# Patient Record
Sex: FEMALE | Race: ASIAN | Employment: UNEMPLOYED | URBAN - METROPOLITAN AREA
[De-identification: names, ages, dates, MRNs, and addresses within clinical notes are randomized per-mention and may not be internally consistent; named-entity substitution may affect disease eponyms.]

---

## 2019-02-21 ENCOUNTER — OFFICE VISIT (OUTPATIENT)
Dept: FAMILY MEDICINE | Facility: CLINIC | Age: 34
End: 2019-02-21
Payer: COMMERCIAL

## 2019-02-21 VITALS
OXYGEN SATURATION: 99 % | SYSTOLIC BLOOD PRESSURE: 108 MMHG | HEIGHT: 65 IN | TEMPERATURE: 97.7 F | BODY MASS INDEX: 18.83 KG/M2 | HEART RATE: 86 BPM | WEIGHT: 113 LBS | DIASTOLIC BLOOD PRESSURE: 80 MMHG

## 2019-02-21 DIAGNOSIS — N94.10 DYSPAREUNIA IN FEMALE: ICD-10-CM

## 2019-02-21 DIAGNOSIS — R10.30 LOWER ABDOMINAL PAIN: Primary | ICD-10-CM

## 2019-02-21 DIAGNOSIS — R42 DIZZINESS: ICD-10-CM

## 2019-02-21 DIAGNOSIS — Z12.4 SCREENING FOR CERVICAL CANCER: ICD-10-CM

## 2019-02-21 DIAGNOSIS — Z76.89 ENCOUNTER TO ESTABLISH CARE: ICD-10-CM

## 2019-02-21 DIAGNOSIS — Z11.3 ROUTINE SCREENING FOR STI (SEXUALLY TRANSMITTED INFECTION): ICD-10-CM

## 2019-02-21 LAB
ALBUMIN UR-MCNC: NEGATIVE MG/DL
APPEARANCE UR: CLEAR
BACTERIA #/AREA URNS HPF: ABNORMAL /HPF
BILIRUB UR QL STRIP: NEGATIVE
COLOR UR AUTO: YELLOW
ERYTHROCYTE [DISTWIDTH] IN BLOOD BY AUTOMATED COUNT: 12.1 % (ref 10–15)
GLUCOSE UR STRIP-MCNC: NEGATIVE MG/DL
HCG UR QL: NEGATIVE
HCT VFR BLD AUTO: 43.9 % (ref 35–47)
HGB BLD-MCNC: 14.1 G/DL (ref 11.7–15.7)
HGB UR QL STRIP: ABNORMAL
KETONES UR STRIP-MCNC: NEGATIVE MG/DL
LEUKOCYTE ESTERASE UR QL STRIP: ABNORMAL
MCH RBC QN AUTO: 29.5 PG (ref 26.5–33)
MCHC RBC AUTO-ENTMCNC: 32.1 G/DL (ref 31.5–36.5)
MCV RBC AUTO: 92 FL (ref 78–100)
NITRATE UR QL: NEGATIVE
NON-SQ EPI CELLS #/AREA URNS LPF: ABNORMAL /LPF
PH UR STRIP: 7 PH (ref 5–7)
PLATELET # BLD AUTO: 228 10E9/L (ref 150–450)
RBC # BLD AUTO: 4.78 10E12/L (ref 3.8–5.2)
RBC #/AREA URNS AUTO: ABNORMAL /HPF
SOURCE: ABNORMAL
SP GR UR STRIP: 1.01 (ref 1–1.03)
SPECIMEN SOURCE: ABNORMAL
UROBILINOGEN UR STRIP-ACNC: 0.2 EU/DL (ref 0.2–1)
WBC # BLD AUTO: 6.2 10E9/L (ref 4–11)
WBC #/AREA URNS AUTO: ABNORMAL /HPF
WET PREP SPEC: ABNORMAL

## 2019-02-21 PROCEDURE — 36415 COLL VENOUS BLD VENIPUNCTURE: CPT | Performed by: NURSE PRACTITIONER

## 2019-02-21 PROCEDURE — 81001 URINALYSIS AUTO W/SCOPE: CPT | Performed by: NURSE PRACTITIONER

## 2019-02-21 PROCEDURE — 85027 COMPLETE CBC AUTOMATED: CPT | Performed by: NURSE PRACTITIONER

## 2019-02-21 PROCEDURE — 87624 HPV HI-RISK TYP POOLED RSLT: CPT | Performed by: NURSE PRACTITIONER

## 2019-02-21 PROCEDURE — 87491 CHLMYD TRACH DNA AMP PROBE: CPT | Performed by: NURSE PRACTITIONER

## 2019-02-21 PROCEDURE — 81025 URINE PREGNANCY TEST: CPT | Performed by: NURSE PRACTITIONER

## 2019-02-21 PROCEDURE — 87591 N.GONORRHOEAE DNA AMP PROB: CPT | Performed by: NURSE PRACTITIONER

## 2019-02-21 PROCEDURE — 80053 COMPREHEN METABOLIC PANEL: CPT | Performed by: NURSE PRACTITIONER

## 2019-02-21 PROCEDURE — 87340 HEPATITIS B SURFACE AG IA: CPT | Performed by: NURSE PRACTITIONER

## 2019-02-21 PROCEDURE — 84443 ASSAY THYROID STIM HORMONE: CPT | Performed by: NURSE PRACTITIONER

## 2019-02-21 PROCEDURE — 0064U ANTB TP TOTAL&RPR IA QUAL: CPT | Performed by: NURSE PRACTITIONER

## 2019-02-21 PROCEDURE — 99204 OFFICE O/P NEW MOD 45 MIN: CPT | Performed by: NURSE PRACTITIONER

## 2019-02-21 PROCEDURE — T1013 SIGN LANG/ORAL INTERPRETER: HCPCS | Mod: U3 | Performed by: NURSE PRACTITIONER

## 2019-02-21 PROCEDURE — 86706 HEP B SURFACE ANTIBODY: CPT | Performed by: NURSE PRACTITIONER

## 2019-02-21 PROCEDURE — 87210 SMEAR WET MOUNT SALINE/INK: CPT | Performed by: NURSE PRACTITIONER

## 2019-02-21 PROCEDURE — G0145 SCR C/V CYTO,THINLAYER,RESCR: HCPCS | Performed by: NURSE PRACTITIONER

## 2019-02-21 PROCEDURE — 87389 HIV-1 AG W/HIV-1&-2 AB AG IA: CPT | Performed by: NURSE PRACTITIONER

## 2019-02-21 RX ORDER — PNV NO.95/FERROUS FUM/FOLIC AC 28MG-0.8MG
TABLET ORAL
COMMUNITY

## 2019-02-21 SDOH — HEALTH STABILITY: MENTAL HEALTH: HOW OFTEN DO YOU HAVE A DRINK CONTAINING ALCOHOL?: NEVER

## 2019-02-21 ASSESSMENT — MIFFLIN-ST. JEOR: SCORE: 1223.44

## 2019-02-21 NOTE — PROGRESS NOTES
SUBJECTIVE:   Merrill Jose is a 33 year old female who presents to clinic today for the following health issues:      ABDOMINAL/ Pelvic - 8 months ago had a physical in [place]- [place] coming to [place] - went to get a physical and all is well.  4 months since her arrival from [facility]    PAIN - At night it seems like when she lays down she is hearing the wheezing, and feels like its difficult of breathing- like congested not SOB- she states has only occurred 3 or 4 times.  Experiences dizziness sometimes when she wakes up- this has happened for a while even before moving to [place]- but its more frequently here.     Onset: 2 and a half weeks    Description:   Character: Cramping/ burning sensation  Location: pelvic region  Radiation: Back - constant for the last month states she gets back pain a week before menstrual period     Intensity: moderate    Progression of Symptoms:  same    Accompanying Signs & Symptoms:  Fever/Chills?: no   Gas/Bloating: YES- at night she can hear rumbling sound at night- sometimes during the day- after eating she can hear them- only has experienced it here in the [place].   Nausea: no   Vomitting: no   Diarrhea?: YES-  When she eats certain foods.  Constipation:YES- feels like there's difficulty in BM's she has to strain as well for her BM's   Dysuria or Hematuria: no- but has Frequency in urine   History:   Trauma: no   Previous similar pain: no    Previous tests done: had a full physical before leaving [facility] but not for this    Precipitating factors:   Does the pain change with:     Food: YES after eating has diarrhea    BM: YES    Urination: no     Alleviating factors:  She sates a long time ago left a UA and got a medication to help with the urination     Therapies Tried and outcome:  Advil - 2 days ago    LMP:  2/7/19     +Epigastric.    Has been taking Cimetidine for upper stomach pain during the menses.      Now for the past 2 years, has had lower back pain associated with menses.    1  "week prior to menstrual cycle, has lower back pain and stiffness with standing.  Back pain starts before and continues throughout menses, less intense during period.    +Cramping during menses.    No change in menstrual flow.    +Dyspareunia.      No chronic health conditions.     - Is desiring pregnancy.         [place]   has lived here for 12 years.      Problem list and histories reviewed & adjusted, as indicated.  Additional history: as documented    There is no problem list on file for this patient.    Past Surgical History:   Procedure Laterality Date     AS THORACOTOMY,CYST REMOVAL      bilateral breast        Social History     Tobacco Use     Smoking status: Never Smoker     Smokeless tobacco: Never Used   Substance Use Topics     Alcohol use: No     Frequency: Never     No family history on file.      Current Outpatient Medications   Medication Sig Dispense Refill     Prenatal Vit-Fe Fumarate-FA (PRENATAL VITAMIN) 27-0.8 MG TABS        No Known Allergies    Reviewed and updated as needed this visit by clinical staff       Reviewed and updated as needed this visit by Provider         ROS:  Constitutional, HEENT, cardiovascular, pulmonary, GI, , musculoskeletal, neuro, skin, endocrine and psych systems are negative, except as otherwise noted.    OBJECTIVE:     /80 (BP Location: Right arm, Patient Position: Sitting, Cuff Size: Adult Regular)   Pulse 86   Temp 97.7  F (36.5  C) (Oral)   Ht 1.651 m (5' 5\")   Wt 51.3 kg (113 lb)   SpO2 99%   BMI 18.80 kg/m    Body mass index is 18.8 kg/m .    GENERAL: healthy, alert and no distress  EYES: Eyes grossly normal to inspection, PERRL and conjunctivae and sclerae normal  HENT: ear canals and TM's normal, nose and mouth without ulcers or lesions  NECK: no adenopathy, no asymmetry, masses, or scars and thyroid normal to palpation  RESP: lungs clear to auscultation - no rales, rhonchi or wheezes  CV: regular rate and " rhythm, normal S1 S2  ABDOMEN: soft, +epigastric, right and left lower quadrant tenderness with palpation, no hepatosplenomegaly, no masses and bowel sounds normal  : normal external genitalia, cervix and uterus normal, Bimanual exam with diffuse uterine/adnexal tenderness to palpation, pap smear, wet prep and GC/CC obtained  MS: no gross musculoskeletal defects noted, no edema  PSYCH: mentation appears normal, affect normal/bright    Diagnostic Test Results:  Pending    ASSESSMENT/PLAN:       Merrill was seen today for abdominal pain.    Diagnoses and all orders for this visit:    Encounter to establish care    Lower abdominal pain  Dyspareunia in female  -     UA reflex to Microscopic and Culture  -     NEISSERIA GONORRHOEA PCR  -     CHLAMYDIA TRACHOMATIS PCR  -     Wet prep  -     US Pelvic Complete w Transvaginal; Future - scheduled on 2/28/19  -     Beta HCG Qual, Urine - [facility] and [place] ([id number])    Dizziness  -     TSH with free T4 reflex  -     CBC with platelets  -     Comprehensive metabolic panel (BMP + Alb, Alk Phos, ALT, AST, Total. Bili, TP)    Screening for cervical cancer  -     Pap imaged thin layer screen with HPV - recommended age 30 - 65 years (select HPV order below)  -     HPV High Risk Types DNA Cervical    Routine screening for STI (sexually transmitted infection)  -     HIV Antigen Antibody Combo  -     Hepatitis B Surface Antibody  -     Hepatitis B surface antigen  -     Treponema Abs w Reflex to RPR and Titer        Follow-up in 1-2 weeks, sooner as needed.        RENATA Dukes [facility]

## 2019-02-21 NOTE — LETTER
March 1, 2019    Merrill Jose  [address]  [place] [address]    Dear ,  This letter is regarding your recent Pap smear (cervical cancer screening) and Human Papillomavirus (HPV) test.  We are happy to inform you that your Pap smear result is normal. Cervical cancer is closely linked with certain types of HPV. Your results showed no evidence of high-risk HPV.  Therefore we recommend you return in 5 years for your next pap smear and HPV test.  You will still need to return to the clinic every year for an annual exam and other preventive tests.  If you have additional questions regarding this result, please call our registered nurse, Bhavna at [phone].  Sincerely,    Ai Mayorga, APRN CNP/es

## 2019-02-22 LAB
ALBUMIN SERPL-MCNC: 4.6 G/DL (ref 3.4–5)
ALP SERPL-CCNC: 21 U/L (ref 40–150)
ALT SERPL W P-5'-P-CCNC: 18 U/L (ref 0–50)
ANION GAP SERPL CALCULATED.3IONS-SCNC: 5 MMOL/L (ref 3–14)
AST SERPL W P-5'-P-CCNC: 20 U/L (ref 0–45)
BILIRUB SERPL-MCNC: 0.8 MG/DL (ref 0.2–1.3)
BUN SERPL-MCNC: 10 MG/DL (ref 7–30)
C TRACH DNA SPEC QL NAA+PROBE: NEGATIVE
CALCIUM SERPL-MCNC: 9.2 MG/DL (ref 8.5–10.1)
CHLORIDE SERPL-SCNC: 103 MMOL/L (ref 94–109)
CO2 SERPL-SCNC: 28 MMOL/L (ref 20–32)
CREAT SERPL-MCNC: 0.58 MG/DL (ref 0.52–1.04)
GFR SERPL CREATININE-BSD FRML MDRD: >90 ML/MIN/{1.73_M2}
GLUCOSE SERPL-MCNC: 81 MG/DL (ref 70–99)
N GONORRHOEA DNA SPEC QL NAA+PROBE: NEGATIVE
POTASSIUM SERPL-SCNC: 3.9 MMOL/L (ref 3.4–5.3)
PROT SERPL-MCNC: 8.1 G/DL (ref 6.8–8.8)
SODIUM SERPL-SCNC: 136 MMOL/L (ref 133–144)
SPECIMEN SOURCE: NORMAL
SPECIMEN SOURCE: NORMAL
T PALLIDUM AB SER QL: NONREACTIVE
TSH SERPL DL<=0.005 MIU/L-ACNC: 3.28 MU/L (ref 0.4–4)

## 2019-02-25 LAB
COPATH REPORT: NORMAL
PAP: NORMAL

## 2019-02-26 LAB
FINAL DIAGNOSIS: NORMAL
HBV SURFACE AB SERPL IA-ACNC: 228.7 M[IU]/ML
HBV SURFACE AG SERPL QL IA: NONREACTIVE
HIV 1+2 AB+HIV1 P24 AG SERPL QL IA: NONREACTIVE
HPV HR 12 DNA CVX QL NAA+PROBE: NEGATIVE
HPV16 DNA SPEC QL NAA+PROBE: NEGATIVE
HPV18 DNA SPEC QL NAA+PROBE: NEGATIVE
SPECIMEN DESCRIPTION: NORMAL
SPECIMEN SOURCE CVX/VAG CYTO: NORMAL

## 2019-02-28 ENCOUNTER — HOSPITAL ENCOUNTER (OUTPATIENT)
Dept: ULTRASOUND IMAGING | Facility: CLINIC | Age: 34
Discharge: HOME | End: 2019-02-28
Attending: NURSE PRACTITIONER | Admitting: NURSE PRACTITIONER
Payer: COMMERCIAL

## 2019-02-28 DIAGNOSIS — R10.30 LOWER ABDOMINAL PAIN: ICD-10-CM

## 2019-02-28 PROCEDURE — 76830 TRANSVAGINAL US NON-OB: CPT

## 2019-03-04 ENCOUNTER — OFFICE VISIT (OUTPATIENT)
Dept: FAMILY MEDICINE | Facility: CLINIC | Age: 34
End: 2019-03-04
Payer: COMMERCIAL

## 2019-03-04 VITALS
SYSTOLIC BLOOD PRESSURE: 102 MMHG | HEIGHT: 65 IN | HEART RATE: 84 BPM | TEMPERATURE: 98.3 F | DIASTOLIC BLOOD PRESSURE: 62 MMHG | WEIGHT: 112.56 LBS | OXYGEN SATURATION: 100 % | BODY MASS INDEX: 18.75 KG/M2

## 2019-03-04 DIAGNOSIS — Z71.2 ENCOUNTER TO DISCUSS TEST RESULTS: Primary | ICD-10-CM

## 2019-03-04 DIAGNOSIS — N83.201 CYST OF RIGHT OVARY: ICD-10-CM

## 2019-03-04 DIAGNOSIS — N76.0 BACTERIAL VAGINOSIS: ICD-10-CM

## 2019-03-04 DIAGNOSIS — B96.89 BACTERIAL VAGINOSIS: ICD-10-CM

## 2019-03-04 DIAGNOSIS — R10.2 PELVIC PAIN IN FEMALE: ICD-10-CM

## 2019-03-04 PROCEDURE — 99214 OFFICE O/P EST MOD 30 MIN: CPT | Performed by: NURSE PRACTITIONER

## 2019-03-04 RX ORDER — METRONIDAZOLE 7.5 MG/G
1 GEL VAGINAL DAILY
Qty: 70 G | Refills: 0 | Status: SHIPPED | OUTPATIENT
Start: 2019-03-04 | End: 2019-03-21

## 2019-03-04 ASSESSMENT — MIFFLIN-ST. JEOR: SCORE: 1221.46

## 2019-03-04 NOTE — PROGRESS NOTES
"  SUBJECTIVE:   Merrill Jose is a 33 year old female who presents to clinic today for the following health issues:      Ultrasound and lab result follow-up.      Patient reports continued right lower quadrant pain.  Tender to palpation.    +Dyspareunia and pelvic pain.  Is desiring pregnancy and is concerned about her fertility.    Has been trying to conceive for the past 4 months, since moving from [facility] to be with her .      Received Hepatitis B immunization in [facility].  Is wondering about her immunity.      Had a pelvic ultrasound in [facility] approximately 8 months ago and was told that she didn't have a cyst at that time.    No known chronic health conditions.    LMP;  2/7/19.      Problem list and histories reviewed & adjusted, as indicated.  Additional history: as documented    There is no problem list on file for this patient.    Past Surgical History:   Procedure Laterality Date     AS THORACOTOMY,CYST REMOVAL      bilateral breast        Social History     Tobacco Use     Smoking status: Never Smoker     Smokeless tobacco: Never Used   Substance Use Topics     Alcohol use: No     Frequency: Never     No family history on file.      Current Outpatient Medications   Medication Sig Dispense Refill     Prenatal Vit-Fe Fumarate-FA (PRENATAL VITAMIN) 27-0.8 MG TABS        No Known Allergies    Reviewed and updated as needed this visit by clinical staff       Reviewed and updated as needed this visit by Provider         ROS:  Constitutional, HEENT, cardiovascular, pulmonary, gi and gu systems are negative, except as otherwise noted.    OBJECTIVE:     /62 (BP Location: Right arm, Patient Position: Sitting, Cuff Size: Adult Small)   Pulse 84   Temp 98.3  F (36.8  C) (Oral)   Ht 1.651 m (5' 5\")   Wt 51.1 kg (112 lb 9 oz)   SpO2 100%   BMI 18.73 kg/m    Body mass index is 18.73 kg/m .    GENERAL: healthy, alert and no distress  RESP: lungs clear to auscultation - no rales, rhonchi or " wheezes  CV: regular rate and rhythm, normal S1 S2  ABDOMEN: soft, right lower quadrant with ttp, no hepatosplenomegaly, no masses and bowel sounds normal  PSYCH: mentation appears normal, affect normal/bright    Office Visit on 02/21/2019   Component Date Value Ref Range Status     Color Urine 02/21/2019 Yellow   Final     Appearance Urine 02/21/2019 Clear   Final     Glucose Urine 02/21/2019 Negative  NEG^Negative mg/dL Final     Bilirubin Urine 02/21/2019 Negative  NEG^Negative Final     Ketones Urine 02/21/2019 Negative  NEG^Negative mg/dL Final     Specific Gravity Urine 02/21/2019 1.010  1.003 - 1.035 Final     Blood Urine 02/21/2019 Trace* NEG^Negative Final     pH Urine 02/21/2019 7.0  5.0 - 7.0 pH Final     Protein Albumin Urine 02/21/2019 Negative  NEG^Negative mg/dL Final     Urobilinogen Urine 02/21/2019 0.2  0.2 - 1.0 EU/dL Final     Nitrite Urine 02/21/2019 Negative  NEG^Negative Final     Leukocyte Esterase Urine 02/21/2019 Trace* NEG^Negative Final     Source 02/21/2019 Midstream Urine   Final     Specimen Descrip 02/21/2019 Cervix   Final     N Gonorrhea PCR 02/21/2019 Negative  NEG^Negative Final    Comment: Negative for N. gonorrhoeae rRNA by transcription mediated amplification.  A negative result by transcription mediated amplification does not preclude   the presence of N. gonorrhoeae infection because results are dependent on   proper and adequate collection, absence of inhibitors, and sufficient rRNA to   be detected.       Specimen Description 02/21/2019 Cervix   Final     Chlamydia Trachomatis PCR 02/21/2019 Negative  NEG^Negative Final    Comment: Negative for C. trachomatis rRNA by transcription mediated amplification.  A negative result by transcription mediated amplification does not preclude   the presence of C. trachomatis infection because results are dependent on   proper and adequate collection, absence of inhibitors, and sufficient rRNA to   be detected.       Specimen Description  02/21/2019 Vagina   Final     Wet Prep 02/21/2019 No Trichomonas seen   Final     Wet Prep 02/21/2019 Clue cells seen*  Final     Wet Prep 02/21/2019 No yeast seen   Final     TSH 02/21/2019 3.28  0.40 - 4.00 mU/L Final     WBC 02/21/2019 6.2  4.0 - 11.0 10e9/L Final     RBC Count 02/21/2019 4.78  3.8 - 5.2 10e12/L Final     Hemoglobin 02/21/2019 14.1  11.7 - 15.7 g/dL Final     Hematocrit 02/21/2019 43.9  35.0 - 47.0 % Final     MCV 02/21/2019 92  78 - 100 fl Final     MCH 02/21/2019 29.5  26.5 - 33.0 pg Final     MCHC 02/21/2019 32.1  31.5 - 36.5 g/dL Final     RDW 02/21/2019 12.1  10.0 - 15.0 % Final     Platelet Count 02/21/2019 228  150 - 450 10e9/L Final     Sodium 02/21/2019 136  133 - 144 mmol/L Final     Potassium 02/21/2019 3.9  3.4 - 5.3 mmol/L Final     Chloride 02/21/2019 103  94 - 109 mmol/L Final     Carbon Dioxide 02/21/2019 28  20 - 32 mmol/L Final     Anion Gap 02/21/2019 5  3 - 14 mmol/L Final     Glucose 02/21/2019 81  70 - 99 mg/dL Final     Urea Nitrogen 02/21/2019 10  7 - 30 mg/dL Final     Creatinine 02/21/2019 0.58  0.52 - 1.04 mg/dL Final     GFR Estimate 02/21/2019 >90  >60 mL/min/[1.73_m2] Final    Comment: Non  GFR Calc  Starting 12/18/2018, serum creatinine based estimated GFR (eGFR) will be   calculated using the Chronic Kidney Disease Epidemiology Collaboration   (CKD-EPI) equation.       GFR Estimate If Black 02/21/2019 >90  >60 mL/min/[1.73_m2] Final    Comment:  GFR Calc  Starting 12/18/2018, serum creatinine based estimated GFR (eGFR) will be   calculated using the Chronic Kidney Disease Epidemiology Collaboration   (CKD-EPI) equation.       Calcium 02/21/2019 9.2  8.5 - 10.1 mg/dL Final     Bilirubin Total 02/21/2019 0.8  0.2 - 1.3 mg/dL Final     Albumin 02/21/2019 4.6  3.4 - 5.0 g/dL Final     Protein Total 02/21/2019 8.1  6.8 - 8.8 g/dL Final     Alkaline Phosphatase 02/21/2019 21* 40 - 150 U/L Final     ALT 02/21/2019 18  0 - 50 U/L Final      AST 02/21/2019 20  0 - 45 U/L Final     PAP 02/21/2019 NIL   Final     Copath Report 02/21/2019    Final                    Value:  Patient Name: ROLANDO ORTEGA  MR#: [id number]  Specimen #: [id number]  Collected: 2/21/2019  Received: 2/22/2019  Reported: 2/25/2019 14:21  Ordering Phy(s): ELOY LEMOS    For improved result formatting, select 'View Enhanced Report Format' under   Linked Documents section.    SPECIMEN/STAIN PROCESS:  Pap imaged thin layer prep screening (Surepath, FocalPoint with guided   screening)       Pap-Cyto x 1, HPV ordered x 1    SOURCE: Cervical, endocervical  ----------------------------------------------------------------   Pap imaged thin layer prep screening (Surepath, FocalPoint with guided   screening)  SPECIMEN ADEQUACY:  Satisfactory for evaluation.  -Transformation zone component absent.    CYTOLOGIC INTERPRETATION:    Negative for intraepithelial lesion or malignancy    Electronically signed out by:  HARLEY Gibson (ASCP)    Processed and screened at [facility],   [facility]    CLINICAL HISTORY:    Papanicolaou Test Limitations:                            Cervical cytology is a screening test with   limited sensitivity; regular  screening is critical for cancer prevention; Pap tests are primarily   effective for the diagnosis/prevention of  squamous cell carcinoma, not adenocarcinomas or other cancers.    TESTING LAB LOCATION:  [facility] [address] [place]  [address] [phone]    COLLECTION SITE:  Client:  [facility]  Location: [facility] (R)       HPV Source 02/21/2019 SurePath   Final     HPV 16 DNA 02/21/2019 Negative  NEG^Negative Final     HPV 18 DNA 02/21/2019 Negative  NEG^Negative Final     Other HR HPV 02/21/2019 Negative  NEG^Negative Final     Final Diagnosis 02/21/2019 This patient's sample is negative for HPV DNA.   Final    Comment: This test was developed and its  performance characteristics determined by the   [facility]. It   has not been cleared or approved by the FDA. The laboratory is regulated under   CLIA as qualified to perform high-complexity testing. This test is used for   clinical purposes. It should not be regarded as investigational or for   research.  (Note)  METHODOLOGY:  The Roche margie 4800 system uses automated extraction,   simultaneous amplification of HPV (L1 region) and beta-globin,    followed by  real time detection of fluorescent labeled HPV and beta   globin using specific oligonucleotide probes . The test specifically   identifies types HPV 16 DNA and HPV 18 DNA while concurrently   detecting the rest of the high risk types (31, 33, 35, 39, 45, 51,   52, 56, 58, 59, 66 or 68).  COMMENTS:  This test is not intended for use as a screening device   for women under age 30 with normal cervical cytology.  Results should   be correl                           ated with cytologic and histologic findings. Close clinical   followup is recommended.       Specimen Description 02/21/2019 Cervical Cells   Final    C19 [id number]     HIV Antigen Antibody Combo 02/21/2019 Nonreactive  NR^Nonreactive     Final    HIV-1 p24 Ag & HIV-1/HIV-2 Ab Not Detected     Hepatitis B Surface Antibody 02/21/2019 228.70* <8.00 m[IU]/mL Final    Comment: Reactive, Patient is considered to be immune to infection with hepatitis B   when the value is greater than or equal to 12.0 m[IU]/mL.       Hep B Surface Agn 02/21/2019 Nonreactive  NR^Nonreactive Final     Treponema Antibodies 02/21/2019 Nonreactive  NR^Nonreactive Final     WBC Urine 02/21/2019 0 - 5  OTO5^0 - 5 /HPF Final     RBC Urine 02/21/2019 O - 2  OTO2^O - 2 /HPF Final     Squamous Epithelial /LPF Urine 02/21/2019 Few  FEW^Few /LPF Final     Bacteria Urine 02/21/2019 Few* NEG^Negative /HPF Final     HCG Qual Urine 02/21/2019 Negative  NEG^Negative Final    Comment:  This test is for screening purposes.  Results should be interpreted along with   the clinical picture.  Confirmation testing is available if warranted by   ordering [id number], HCG Quantitative Pregnancy.         ASSESSMENT/PLAN:     Merrill was seen today for ultrasound.    Diagnoses and all orders for this visit:    Encounter to discuss test results  Reviewed all lab results and ultrasound results from previous visit.     Cyst of right ovary  Pelvic pain in female/dyspareunia  Pelvic ultrasound completed on 2/28/19  IMPRESSION:   1. Small-to-moderate amount of free fluid in the pelvis is of slightly  greater volume than is typically seen physiologically.  2. Small probable collapsing cyst in the right ovary measures 2 cm.  -     OB/GYN REFERRAL - further consultation     Bacterial vaginosis  -     metroNIDAZOLE (METROGEL) 0.75 % vaginal gel; Place 1 applicator (5 g) vaginally daily for 5 days    Follow-up in clinic with myself in 1 year, sooner as needed.      The visit was 25 minutes > 1/2 of the visit was spent in counseling and coordination of care.       RENATA Dukes CNP  [facility]

## 2019-03-21 ENCOUNTER — OFFICE VISIT (OUTPATIENT)
Dept: OBGYN | Facility: CLINIC | Age: 34
End: 2019-03-21
Payer: COMMERCIAL

## 2019-03-21 VITALS
WEIGHT: 114 LBS | BODY MASS INDEX: 18.99 KG/M2 | HEIGHT: 65 IN | SYSTOLIC BLOOD PRESSURE: 100 MMHG | DIASTOLIC BLOOD PRESSURE: 58 MMHG

## 2019-03-21 DIAGNOSIS — N83.201 RIGHT OVARIAN CYST: ICD-10-CM

## 2019-03-21 DIAGNOSIS — R10.2 PELVIC PAIN IN FEMALE: Primary | ICD-10-CM

## 2019-03-21 LAB
SPECIMEN SOURCE: NORMAL
WET PREP SPEC: NORMAL

## 2019-03-21 PROCEDURE — 99204 OFFICE O/P NEW MOD 45 MIN: CPT | Performed by: OBSTETRICS & GYNECOLOGY

## 2019-03-21 PROCEDURE — T1013 SIGN LANG/ORAL INTERPRETER: HCPCS | Mod: U3 | Performed by: OBSTETRICS & GYNECOLOGY

## 2019-03-21 PROCEDURE — 87210 SMEAR WET MOUNT SALINE/INK: CPT | Performed by: OBSTETRICS & GYNECOLOGY

## 2019-03-21 ASSESSMENT — MIFFLIN-ST. JEOR: SCORE: 1227.98

## 2019-03-21 NOTE — NURSING NOTE
"Chief Complaint   Patient presents with     Fertility     right collapsing cyst U/S 2/28       Initial /58 (BP Location: Right arm, Patient Position: Chair, Cuff Size: Adult Regular)   Ht 1.651 m (5' 5\")   Wt 51.7 kg (114 lb)   LMP 03/09/2019 (Exact Date)   BMI 18.97 kg/m   Estimated body mass index is 18.97 kg/m  as calculated from the following:    Height as of this encounter: 1.651 m (5' 5\").    Weight as of this encounter: 51.7 kg (114 lb).  BP completed using cuff size: regular    Questioned patient about current smoking habits.  Pt. has never smoked.      No obstetric history on file.    The following HM Due: NONE      Tanisha Diaz CMA    "

## 2019-03-21 NOTE — PROGRESS NOTES
"  SUBJECTIVE:                                                   Merrill Jose is a 33 year old female who presents to clinic today for the following health issue(s):  Patient presents with:  Fertility: right collapsing cyst U/S 2/28     present.    HPI:  Here with her . They moved here from [facility] in 2018. 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    Complaint of right sided pelvic pain, constant burning sensation. Gets worse during the day. Present every day. No worsening with menses.  Present for the last three months.     Would also like to discuss fertility. Has been trying for 4 months. On a PNV. No previous pregnancies.  has had children in the past.     Patient's last menstrual period was 03/09/2019 (exact date). Periods are regular, 30 days.  Patient is sexually active, G0. Recent STI testing negative.  Using none for contraception.     Recently finished treatment for Bacterial Vaginosis (BV)     Pap 2/21/2019 Pap NIL, HPV neg    Problem list and histories reviewed & adjusted, as indicated.  Additional history: as documented.    There is no problem list on file for this patient.    Past Surgical History:   Procedure Laterality Date     AS THORACOTOMY,CYST REMOVAL      bilateral breast       Social History     Tobacco Use     Smoking status: Never Smoker     Smokeless tobacco: Never Used   Substance Use Topics     Alcohol use: No     Frequency: Never             Current Outpatient Medications on File Prior to Visit:  Prenatal Vit-Fe Fumarate-FA (PRENATAL VITAMIN) 27-0.8 MG TABS      No current facility-administered medications on file prior to visit.   No Known Allergies    ROS:  12 point ROS negative except as noted above in HPI, including Gen., Resp., CV, GI &  system review.    OBJECTIVE:     /58 (BP Location: Right arm, Patient Position: Chair, Cuff Size: Adult Regular)   Ht 1.651 m (5' 5\")   Wt 51.7 kg (114 lb)   LMP 03/09/2019 (Exact Date)   BMI 18.97 kg/m   "   BMI: Body mass index is 18.97 kg/m .  General: Alert and oriented, no distress.  Psychiatric: Mood and affect within normal limits.  Lymph:  No inguinal lymphadenopathy palpable.   Abdomen: Soft, nontender, no hepatosplenomegaly, no rebound or guarding, no masses, no hernias.   Vulva:  No external lesions, normal female hair distribution, no inguinal adenopathy.    Urethra:  Midline, non-tender, well supported, no discharge  Vagina:  Well-estrogenized, no lesions, thick white vaginal discharge  Cervix: no lesions, no discharge and nulliparous  Uterus:  retroverted, smooth contour, without enlargement, mobile, without tenderness  Ovaries:  No masses appreciated, non-tender, mobile  No inguinal hernia appreciated  Rectal Exam: deferred  Musculoskeletal: extremities normal    In-Clinic Test Results:  No results found for this or any previous visit (from the past 24 hour(s)).     ULTRASOUND PELVIC COMPLETE WITH TRANSVAGINAL IMAGING  2/28/2019 9:12  AM      HISTORY: Lower abdominal pain.     TECHNIQUE:  Transvaginal imaging was added to the transabdominal  scans.     COMPARISON: None.     FINDINGS:  The uterus is measured at 5.8 x 3.9 x 4.6 cm. No fibroids  are evident. Endometrial stripe measures 1.1 cm and is within normal  limits for a premenopausal patient. The right ovary contains a small  probable collapsing cyst measuring 2 cm. The left ovary is  unremarkable.  There is a small-to-moderate amount of anechoic free  pelvic fluid noted in the posterior cul-de-sac. Color Doppler blood  flow is noted within the ovaries bilaterally.                                                                      IMPRESSION:   1. Small-to-moderate amount of free fluid in the pelvis is of slightly  greater volume than is typically seen physiologically.  2. Small probable collapsing cyst in the right ovary measures 2 cm.     BOBBY HERNANDEZ MD    ASSESSMENT/PLAN:                                                        ICD-10-CM     1. Pelvic pain in female R10.2 US Pelvic Complete w Transvaginal   2. Right ovarian cyst N83.201 US Pelvic Complete w Transvaginal       Plan follow up ultrasound due to continued right sided burning pain with history of collapsing cyst on the right. Cyst in ultrasound above likely physiologic.  If wet prep and ultrasound normal, recommend following up with PCP for MSK cause or possibly inguinal hernia.     Discussed timing of ovulation and intercourse. Pictures drawn. Discussed fecundity and probable length of time to obtain pregnancy. She has had MMR and Varicella.  No other medical problems to suggest difficulty in conceiving. Recommend seeing me in 5 months if has not conceived in this time.     Total face to face time 45 minutes, with > 50% spent counseling and/or coordination of care.      Sari Kwong, DO ASHER  [facility]

## 2019-03-22 NOTE — LETTER
[facility]  [address], [address]  [place] [address]  [phone]        March 22, 2019    Merrill Jose  [address]  [place] [address]            Dear Ms. Merrill Jose:      The results of your recent Wet Prep were NORMAL.  If you have any further questions or problems, please contact our office.    Results for orders placed or performed in visit on 03/21/19   Wet prep   Result Value Ref Range    Specimen Description Vagina     Wet Prep No Trichomonas seen     Wet Prep No clue cells seen     Wet Prep No yeast seen     Wet Prep No WBC's seen        Sincerely,      Sari Kwong, DO

## 2019-04-25 ENCOUNTER — APPOINTMENT (OUTPATIENT)
Dept: NURSING | Facility: CLINIC | Age: 34
End: 2019-04-25
Payer: COMMERCIAL